# Patient Record
Sex: FEMALE | Race: WHITE | NOT HISPANIC OR LATINO | Employment: OTHER | URBAN - METROPOLITAN AREA
[De-identification: names, ages, dates, MRNs, and addresses within clinical notes are randomized per-mention and may not be internally consistent; named-entity substitution may affect disease eponyms.]

---

## 2018-03-28 ENCOUNTER — HOSPITAL ENCOUNTER (OUTPATIENT)
Dept: LAB | Facility: HOSPITAL | Age: 78
Discharge: HOME | End: 2018-03-28
Attending: INTERNAL MEDICINE | Admitting: INTERNAL MEDICINE

## 2018-06-27 ENCOUNTER — HOSPITAL ENCOUNTER (OUTPATIENT)
Dept: CARDIOLOGY | Facility: HOSPITAL | Age: 78
Discharge: HOME | End: 2018-06-27
Attending: INTERNAL MEDICINE | Admitting: INTERNAL MEDICINE

## 2018-08-06 ENCOUNTER — HOSPITAL ENCOUNTER (OUTPATIENT)
Dept: PREADMISSION TESTING | Facility: HOSPITAL | Age: 78
Discharge: HOME | End: 2018-08-06
Attending: INTERNAL MEDICINE | Admitting: INTERNAL MEDICINE

## 2018-08-06 LAB
ANION GAP SERPL CALC-SCNC: 13.9 MMOL/L (ref 10–20)
APTT BLD: 24.6 SEC (ref 24–31)
BASOPHILS # BLD AUTO: 0.1 10*3/UL (ref 0–0.2)
BASOPHILS NFR BLD AUTO: 1 % (ref 0–2)
BUN SERPL-MCNC: 20 MG/DL (ref 8–20)
BUN/CREAT SERPL: 13.3 (ref 5.4–26.2)
CALCIUM SERPL-MCNC: 8.9 MG/DL (ref 8.9–10.3)
CHLORIDE SERPL-SCNC: 98 MMOL/L (ref 101–111)
CONV CO2: 27 MMOL/L (ref 22–32)
CREAT UR-MCNC: 1.5 MG/DL (ref 0.4–1)
DIFFERENTIAL METHOD BLD: (no result)
EOSINOPHIL # BLD AUTO: 0.2 10*3/UL (ref 0–0.3)
EOSINOPHIL # BLD AUTO: 2 % (ref 0–3)
ERYTHROCYTE [DISTWIDTH] IN BLOOD BY AUTOMATED COUNT: 17.1 % (ref 11.5–14.5)
GLUCOSE SERPL-MCNC: 265 MG/DL (ref 65–99)
HCT VFR BLD AUTO: 40.5 % (ref 35–49)
HGB BLD-MCNC: 13.1 G/DL (ref 12–15)
INR PPP: 1
LYMPHOCYTES # BLD AUTO: 2.2 10*3/UL (ref 0.8–4.8)
LYMPHOCYTES NFR BLD AUTO: 24 % (ref 18–42)
MCH RBC QN AUTO: 27.3 PG (ref 26–32)
MCHC RBC AUTO-ENTMCNC: 32.5 G/DL (ref 32–36)
MCV RBC AUTO: 84.1 FL (ref 80–94)
MONOCYTES # BLD AUTO: 0.6 10*3/UL (ref 0.1–1.3)
MONOCYTES NFR BLD AUTO: 7 % (ref 2–11)
NEUTROPHILS # BLD AUTO: 6 10*3/UL (ref 2.3–8.6)
NEUTROPHILS NFR BLD AUTO: 66 % (ref 50–75)
NRBC BLD AUTO-RTO: 0 /100{WBCS}
NRBC/RBC NFR BLD MANUAL: 0 10*3/UL
PLATELET # BLD AUTO: 124 10*3/UL (ref 150–450)
PMV BLD AUTO: 12.3 FL (ref 7.4–10.4)
POTASSIUM SERPL-SCNC: 3.9 MMOL/L (ref 3.6–5.1)
PROTHROMBIN TIME: 11.1 SEC (ref 9.6–11.7)
RBC # BLD AUTO: 4.81 10*6/UL (ref 4–5.4)
SODIUM SERPL-SCNC: 135 MMOL/L (ref 136–144)
WBC # BLD AUTO: 9.2 10*3/UL (ref 4.5–11.5)

## 2018-08-15 ENCOUNTER — OFFICE VISIT (OUTPATIENT)
Dept: CARDIAC SURGERY | Facility: CLINIC | Age: 78
End: 2018-08-15

## 2018-08-15 VITALS
BODY MASS INDEX: 35.01 KG/M2 | SYSTOLIC BLOOD PRESSURE: 111 MMHG | RESPIRATION RATE: 20 BRPM | HEART RATE: 100 BPM | HEIGHT: 69 IN | DIASTOLIC BLOOD PRESSURE: 70 MMHG | OXYGEN SATURATION: 95 % | TEMPERATURE: 98 F | WEIGHT: 236.4 LBS

## 2018-08-15 DIAGNOSIS — I25.118 CORONARY ARTERY DISEASE INVOLVING NATIVE CORONARY ARTERY OF NATIVE HEART WITH OTHER FORM OF ANGINA PECTORIS (HCC): Primary | ICD-10-CM

## 2018-08-15 DIAGNOSIS — I34.0 NON-RHEUMATIC MITRAL REGURGITATION: ICD-10-CM

## 2018-08-15 PROCEDURE — 99205 OFFICE O/P NEW HI 60 MIN: CPT | Performed by: THORACIC SURGERY (CARDIOTHORACIC VASCULAR SURGERY)

## 2018-08-15 RX ORDER — IRBESARTAN 150 MG/1
150 TABLET ORAL NIGHTLY
COMMUNITY
End: 2019-01-01

## 2018-08-30 ENCOUNTER — OUTSIDE FACILITY SERVICE (OUTPATIENT)
Dept: CARDIAC SURGERY | Facility: CLINIC | Age: 78
End: 2018-08-30

## 2018-08-30 PROCEDURE — 33259 ABLATE ATRIA W/BYPASS ADD-ON: CPT | Performed by: PHYSICIAN ASSISTANT

## 2018-08-30 PROCEDURE — 33508 ENDOSCOPIC VEIN HARVEST: CPT | Performed by: THORACIC SURGERY (CARDIOTHORACIC VASCULAR SURGERY)

## 2018-08-30 PROCEDURE — 33519 CABG ARTERY-VEIN THREE: CPT | Performed by: PHYSICIAN ASSISTANT

## 2018-08-30 PROCEDURE — 33533 CABG ARTERIAL SINGLE: CPT | Performed by: THORACIC SURGERY (CARDIOTHORACIC VASCULAR SURGERY)

## 2018-08-30 PROCEDURE — 33508 ENDOSCOPIC VEIN HARVEST: CPT | Performed by: PHYSICIAN ASSISTANT

## 2018-08-30 PROCEDURE — 33519 CABG ARTERY-VEIN THREE: CPT | Performed by: THORACIC SURGERY (CARDIOTHORACIC VASCULAR SURGERY)

## 2018-08-30 PROCEDURE — 33533 CABG ARTERIAL SINGLE: CPT | Performed by: PHYSICIAN ASSISTANT

## 2018-08-30 PROCEDURE — 33259 ABLATE ATRIA W/BYPASS ADD-ON: CPT | Performed by: THORACIC SURGERY (CARDIOTHORACIC VASCULAR SURGERY)

## 2018-10-08 ENCOUNTER — OFFICE VISIT (OUTPATIENT)
Dept: CARDIAC SURGERY | Facility: CLINIC | Age: 78
End: 2018-10-08

## 2018-10-08 VITALS
RESPIRATION RATE: 20 BRPM | WEIGHT: 230 LBS | BODY MASS INDEX: 34.07 KG/M2 | OXYGEN SATURATION: 93 % | HEART RATE: 83 BPM | SYSTOLIC BLOOD PRESSURE: 150 MMHG | DIASTOLIC BLOOD PRESSURE: 66 MMHG | HEIGHT: 69 IN | TEMPERATURE: 97.6 F

## 2018-10-08 DIAGNOSIS — Z86.79 S/P MAZE OPERATION FOR ATRIAL FIBRILLATION: ICD-10-CM

## 2018-10-08 DIAGNOSIS — Z98.890 S/P MAZE OPERATION FOR ATRIAL FIBRILLATION: ICD-10-CM

## 2018-10-08 DIAGNOSIS — Z95.1 S/P CABG (CORONARY ARTERY BYPASS GRAFT): Primary | ICD-10-CM

## 2018-10-08 PROCEDURE — 99024 POSTOP FOLLOW-UP VISIT: CPT | Performed by: THORACIC SURGERY (CARDIOTHORACIC VASCULAR SURGERY)

## 2018-10-09 NOTE — PROGRESS NOTES
Gina Liz is a 77 y.o. female s/p CABG/Maze. She denies any signs or symptoms of myocardial ischemia, cerebrovascular event, or infection. She reports good exercise tolerance.    Sternum is stable, incision is clean, dry, and intact.   CTA B/L.   Lower extremity incisions are clean, dry and intact.  GCS 15, no neurologic deficit.    She appears to be doing well. Follow up with us will be on a PRN basis.

## 2019-01-01 ENCOUNTER — OFFICE VISIT (OUTPATIENT)
Dept: CARDIOLOGY | Facility: CLINIC | Age: 79
End: 2019-01-01

## 2019-01-01 ENCOUNTER — CLINICAL SUPPORT NO REQUIREMENTS (OUTPATIENT)
Dept: CARDIOLOGY | Facility: CLINIC | Age: 79
End: 2019-01-01

## 2019-01-01 VITALS
DIASTOLIC BLOOD PRESSURE: 81 MMHG | OXYGEN SATURATION: 100 % | BODY MASS INDEX: 32.64 KG/M2 | RESPIRATION RATE: 18 BRPM | SYSTOLIC BLOOD PRESSURE: 137 MMHG | HEART RATE: 91 BPM | WEIGHT: 221 LBS

## 2019-01-01 DIAGNOSIS — I49.5 SICK SINUS SYNDROME (HCC): ICD-10-CM

## 2019-01-01 DIAGNOSIS — I49.5 SICK SINUS SYNDROME (HCC): Primary | ICD-10-CM

## 2019-01-01 DIAGNOSIS — I10 ESSENTIAL HYPERTENSION: ICD-10-CM

## 2019-01-01 DIAGNOSIS — I48.19 PERSISTENT ATRIAL FIBRILLATION (HCC): Primary | ICD-10-CM

## 2019-01-01 DIAGNOSIS — Z95.0 PRESENCE OF CARDIAC PACEMAKER: ICD-10-CM

## 2019-01-01 PROCEDURE — 93000 ELECTROCARDIOGRAM COMPLETE: CPT | Performed by: INTERNAL MEDICINE

## 2019-01-01 PROCEDURE — 93280 PM DEVICE PROGR EVAL DUAL: CPT | Performed by: INTERNAL MEDICINE

## 2019-01-01 PROCEDURE — 99214 OFFICE O/P EST MOD 30 MIN: CPT | Performed by: INTERNAL MEDICINE

## 2019-01-01 RX ORDER — GLIMEPIRIDE 2 MG/1
2 TABLET ORAL 2 TIMES DAILY
Refills: 1 | COMMUNITY
Start: 2019-01-01

## 2019-01-01 RX ORDER — BUMETANIDE 1 MG/1
1 TABLET ORAL 2 TIMES DAILY
Refills: 0 | COMMUNITY
Start: 2019-01-01

## 2019-01-01 RX ORDER — CLOPIDOGREL BISULFATE 75 MG/1
75 TABLET ORAL DAILY
Refills: 4 | COMMUNITY
Start: 2019-01-01 | End: 2019-01-01

## 2019-01-01 RX ORDER — SPIRONOLACTONE 25 MG/1
1 TABLET ORAL DAILY
COMMUNITY
Start: 2014-03-05

## 2019-01-01 RX ORDER — AMIODARONE HYDROCHLORIDE 200 MG/1
200 TABLET ORAL DAILY
Refills: 3 | COMMUNITY
Start: 2019-01-01 | End: 2019-01-01

## 2019-01-01 RX ORDER — PANTOPRAZOLE SODIUM 40 MG/1
40 TABLET, DELAYED RELEASE ORAL DAILY PRN
Refills: 1 | COMMUNITY
Start: 2019-01-01

## 2019-01-01 RX ORDER — ROSUVASTATIN CALCIUM 20 MG/1
1 TABLET, COATED ORAL DAILY
COMMUNITY
Start: 2014-03-05

## 2019-01-01 RX ORDER — POTASSIUM CHLORIDE 750 MG/1
1 CAPSULE, EXTENDED RELEASE ORAL DAILY
COMMUNITY
Start: 2019-01-01

## 2019-01-01 RX ORDER — LEVOTHYROXINE SODIUM 0.07 MG/1
1 TABLET ORAL DAILY
COMMUNITY
Start: 2012-03-15

## 2019-12-02 PROBLEM — I10 BENIGN ESSENTIAL HYPERTENSION: Status: ACTIVE | Noted: 2019-01-01

## 2019-12-02 PROBLEM — Z95.0 PRESENCE OF CARDIAC PACEMAKER: Status: ACTIVE | Noted: 2018-01-22

## 2019-12-02 PROBLEM — I48.91 ATRIAL FIBRILLATION (HCC): Status: ACTIVE | Noted: 2019-01-01

## 2019-12-02 NOTE — PROGRESS NOTES
CC--Atrial fibrillation, hypertension and prior history of recurrent bleeding    Sub--79-year-old female patient with a pacemaker placed few years ago and  cames  for follow up-- patient has a Biotronik pacemaker with intermittent atrial fibrillation and was placed on anti coagulation. patient had significant issues with bleeding and had to see OBG/YN--She underwent a Watchman placement --She underwent ERIN which revealed excellent sealing of the appendage without any leak--Patient is off Coumadin-- developed progressive shortness of breath and underwent stress test followed by left heart catheterization revealing significant multivessel coronary artery disease needing bypass surgery and during that time had Maze procedure --patient went back to atrial fibrillation several months ago despite amiodarone intake and was recommended cardioversion which she has refused   She complains of fatigue and tiredness and also hair loss and loss of appetite   Her primary care physician does all her lab work     patient chronic medical problems include coronary artery disease, peripheral vascular disease, carotid artery disease, hypertension and diabetes and hyperlipidemia  She has prior history of heart failure  She complains of significant skin bruising with dual antiplatelet agents        assessment plan     sick sinus syndrome with pacemaker in situ --pacemaker is VDD type--underlying atrial fibrillation is ongoing  Persistent  atrial fibrillation with high a chads Vasc score-- prior history of recurrent bleeding-- patient not a candidate for long-term anticoagulation and post left atrial appendage  occlusion device   progressive coronary artery disease needing bypass surgery and Maze procedure with recurrent atrial arrhythmias   hypertension   coronary artery disease   peripheral vascular dx     normal pacemaker function  Rhythm control versus rate control discussed and patient does not want any more procedures  No role for  amiodarone and amiodarone should be stopped  Follow-up in 3 months  Stop Plavix for subcutaneous bleeding and continue aspirin  Medications reviewed        Vital Signs: Blood pressure 137/81 pulse rate is 91 patient afebrile respiration 12 times a minute  EKG shows underlying atrial fibrillation nonspecific ST-T wave changes and underlying artifacts with QRS of 104 QT of 403 along with right axis deviation and diffuse nonspecific ST-T wave changes and no significant EKG changes compared to prior EKG and EKG indication includes intermittent atrial arrhythmias    Current Allergies (reviewed today):  PENICILLIN (Moderate)      Past Medical History:     Reviewed history from 10/01/2018 and no changes required:        Hyperlipidemia        Hypertension        Peripheral Vascular Disease: S/P Endarterectomy         Carotid stenosis        Hypothyroidism        Atrial Fibrillation        Coronary Heart Disease: PCI with 3 stents with 2 cath/PCI's : CABG 4 vessel         SSS: S/P Pacemaker implantation         Diabetes, Type 2        Watchman (LAAC) device 03/29/2018    Past Surgical History:     Reviewed history from 10/01/2018 and no changes required:        Endarterectomy        Cardiac Cath, 2-8-00, LAD 50%, LCX 50%, RCA 50%        Carpal Tunnel -Right         Cataract Extraction: Bilateral         Pacemaker implantation         P T C A: first time one stent - second time 2 stents by Dr. Toledo         ERIN at [facility] : 2-27-18        Watchman (LAAC) device implant 03/29/2018        CABG: [facility] 8-30-18 4 vessel / LIMA to LAD, SV to PDA, SV seq to OM1 and then OM2 . LLE EVH. [facility]-Maze         Social History:     Reviewed history from 08/21/2018 and no changes required:        Patient is a former smoker.        Passive Smoke: N        Alcohol Use: N        Drug Use: N        HIV/High Risk: N        Regular Exercise: N        Hx Domestic Abuse: N        Zoroastrian Affecting Care: N              Review of Systems   General:  positive  for fatigue and shortness of breath  Eyes: No redness  Cardiovascular: No chest pain, no palpitations  Gastrointestinal: No nausea or vomiting, bleeding  Genitourinary: no hematuria or dysuria  Musculoskeletal: No arthralgia or myalgia  Skin: No rash  Neurologic: No numbness, tingling, syncope  Hematologic/Lymphatic: No abnormal bleeding      Physical Exam    General:      well developed, well nourished, in no acute distress.    Head:      normocephalic and atraumatic.    Eyes:      PERRL/EOM intact, conjunctiva and sclera clear with out nystagmus.    Neck:       no JVD.  Soft left carotid bruit audible   trachea central and normal respiratory effort  Lungs:      clear bilaterally to auscultation.    Heart:      non-displaced PMI, chest non-tender; irregular rate and rhythm, S1, S2 without murmurs, rubs, or gallops  Skin:      intact without lesions or rashes.    Psych:      alert and cooperative; normal mood and affect; normal attention span and concentration.        Abdomen soft and nontender without hepatomegaly  Neurologically alert and oriented x3 without any focal deficits  Mild peripheral edema and subcutaneous bruising noted  Back exam shows mild kyphosis

## 2020-01-01 ENCOUNTER — OUTSIDE FACILITY SERVICE (OUTPATIENT)
Dept: CARDIOLOGY | Facility: CLINIC | Age: 80
End: 2020-01-01

## 2020-01-01 PROCEDURE — 93010 ELECTROCARDIOGRAM REPORT: CPT | Performed by: INTERNAL MEDICINE

## 2020-01-01 PROCEDURE — 99232 SBSQ HOSP IP/OBS MODERATE 35: CPT | Performed by: INTERNAL MEDICINE

## 2020-01-09 ENCOUNTER — INPATIENT HOSPITAL (OUTPATIENT)
Dept: URBAN - METROPOLITAN AREA HOSPITAL 76 | Facility: HOSPITAL | Age: 80
End: 2020-01-09

## 2020-01-09 DIAGNOSIS — R18.8 OTHER ASCITES: ICD-10-CM

## 2020-01-09 DIAGNOSIS — R94.5 ABNORMAL RESULTS OF LIVER FUNCTION STUDIES: ICD-10-CM

## 2020-01-09 DIAGNOSIS — K74.60 UNSPECIFIED CIRRHOSIS OF LIVER: ICD-10-CM

## 2020-01-09 DIAGNOSIS — R93.3 ABNORMAL FINDINGS ON DIAGNOSTIC IMAGING OF OTHER PARTS OF DI: ICD-10-CM

## 2020-01-09 PROCEDURE — 99222 1ST HOSP IP/OBS MODERATE 55: CPT | Performed by: NURSE PRACTITIONER

## 2020-04-22 NOTE — PROGRESS NOTES
BCS CONSULT    Reason for Consultation: Possible surgical revascularization.    History of Present Illness:     This is a pleasant 77 year old woman with a history of  CAD with stenting, chronic atrial fibrillation, and sick sinus syndrome. She does have a single chamber PPM and also received a Watchman device earlier this year. She has also had problems with bleeding (GYN) on Coumadin. Over the last 2-4 months she has suffered increasing dyspnea, now even on moderate exertion. She has also noticed periodic lower extremity edema that resolves with leg elevation. She denies any orthopnea or PND. She denies any apneic symptoms. She denies any classic or surrogate anginal symptoms. She denies any syncope or disorientation.    LHC on 8/7/18 showed an 80% proximal LAD lesion, a mid 90% LAD lesion, and a small distal artery. The CX system has an 80% lesion and a patent distal stent in the CX main body. Two moderated sized OM branches could potentially receive bypasses. The dominant RCA system has a proximal patent stent, but has a 70% distal lesion; the PDA appears to be a viable surgical target.    A ERIN performed on 5/15/18 showed a LVEF of 50%, an intact Watchman device, and moderate mitral valve regurgitation. Mild tricuspid valve regurgitation is also noted.    She has been referred for possible surgical revascularization.    Review of Systems   Constitutional: Positive for activity change and fatigue. Negative for appetite change, chills, diaphoresis, fever and unexpected weight change.   HENT: Negative for congestion, dental problem, hearing loss, mouth sores, nosebleeds, postnasal drip, rhinorrhea, sneezing, trouble swallowing and voice change.    Eyes: Negative for visual disturbance.   Respiratory: Positive for shortness of breath and wheezing. Negative for apnea, cough, choking, chest tightness and stridor.    Cardiovascular: Positive for leg swelling. Negative for chest pain and palpitations.    Gastrointestinal: Negative for abdominal pain, constipation, diarrhea, nausea and vomiting.   Endocrine: Negative for cold intolerance, heat intolerance and polyuria.   Genitourinary: Negative for difficulty urinating, dysuria and hematuria.   Musculoskeletal: Positive for back pain. Negative for arthralgias, gait problem, joint swelling, myalgias, neck pain and neck stiffness.   Skin: Negative for color change, pallor, rash and wound.   Neurological: Negative for dizziness, tremors, seizures, syncope, facial asymmetry, speech difficulty, weakness, light-headedness, numbness and headaches.   Hematological: Negative for adenopathy. Does not bruise/bleed easily.   Psychiatric/Behavioral: Positive for sleep disturbance. Negative for agitation, behavioral problems, confusion, decreased concentration, dysphoric mood, hallucinations, self-injury and suicidal ideas. The patient is not nervous/anxious and is not hyperactive.         Past Medical History:   Diagnosis Date   • Abnormal cardiovascular stress test 07/2018    Progressive Ischemia   • Atrial fibrillation (CMS/HCC)     On Anticoag   • CAD (coronary artery disease)     Multi Vessel   • Carotid arterial disease (CMS/HCC)    • Diabetes (CMS/HCC)     Type 2   • History of echocardiogram 3/30/18-[facility]    See Report w/Mod to Severe Pulm Hypertension   • Hyperlipidemia     Meds   • Hypertension     Controlled   • Hypothyroidism     Synthroid   • Pacemaker 2015 ?    Biotronik   • Peripheral vascular disorder (CMS/HCC)    • PMB (postmenopausal bleeding)     Saw OBGYN   • Pulmonary hypertension 03/30/2018    Mod to Severe According to Echo   • SOB (shortness of breath) 7/17/18-[place]    Mild to Moderate--Saw Dr. Harris     Past Surgical History:   Procedure Laterality Date   • CARDIAC ASSIST DEVICE INSERTION  3/29/18-Inserted    LAAC Placement (Watchman)   • CARDIAC CATHETERIZATION  08/7/18-[facility]   • CATARACT EXTRACTION Bilateral    •  "CORONARY ANGIOPLASTY WITH STENT PLACEMENT      x3 Stents Placed  w Hx 2 Cath's   • PACEMAKER IMPLANTATION     • ERIN  02/27/2018    Normal      No family history on file.  Social History   Substance Use Topics   • Smoking status: Former Smoker     Packs/day: 1.00     Types: Cigarettes   • Smokeless tobacco: Never Used      Comment: Quit in 1980   • Alcohol use No      Comment: 1 Drink Caffeine/Day       (Not in a hospital admission)    Current Outpatient Prescriptions:   •  aspirin 81 MG tablet, Take 81 mg by mouth Daily., Disp: , Rfl:   •  Cholecalciferol (VITAMIN D3) 5000 units capsule capsule, Take 1,000 Units by mouth Daily., Disp: , Rfl:   •  clopidogrel (PLAVIX) 75 MG tablet, Take 75 mg by mouth Daily., Disp: , Rfl:   •  digoxin (LANOXIN) 250 MCG tablet, Take 250 mcg by mouth Daily., Disp: , Rfl:   •  DilTIAZem HCl Coated Beads (DILT-CD PO), Take 240 mg by mouth., Disp: , Rfl:   •  furosemide (LASIX) 20 MG tablet, Take 20 mg by mouth Daily., Disp: , Rfl:   •  hydrochlorothiazide (HYDRODIURIL) 25 MG tablet, Take 30 mg by mouth Daily., Disp: , Rfl:   •  insulin NPH (HUMULIN N) 100 UNIT/ML injection, Inject  under the skin into the appropriate area as directed 2 (Two) Times a Day Before Meals., Disp: , Rfl:   •  irbesartan (AVAPRO) 150 MG tablet, Take 150 mg by mouth Every Night., Disp: , Rfl:   •  lansoprazole (PREVACID) 30 MG capsule, Take 30 mg by mouth Daily., Disp: , Rfl:   •  levothyroxine (SYNTHROID, LEVOTHROID) 88 MCG tablet, Take 88 mcg by mouth Daily., Disp: , Rfl:   •  Loratadine (CLARITIN) 10 MG capsule, Take  by mouth Daily., Disp: , Rfl:   •  pravastatin (PRAVACHOL) 20 MG tablet, Take 20 mg by mouth Daily., Disp: , Rfl:     Allergies:  Penicillins    Objective      Vital Signs       Flowsheet Rows      First Filed Value   Admission Height  175.3 cm (69\") Documented at 08/15/2018 0855   Admission Weight  107 kg (236 lb 6.4 oz) Documented at 08/15/2018 0855        175.3 cm (69\")    Physical Exam "   Constitutional: She is oriented to person, place, and time. She appears well-developed and well-nourished.   HENT:   Head: Normocephalic and atraumatic.   Mouth/Throat: No oropharyngeal exudate.   Eyes: Pupils are equal, round, and reactive to light. EOM are normal. No scleral icterus.   Neck: Normal range of motion. Neck supple. No JVD present. No tracheal deviation present. No thyromegaly present.   Cardiovascular: Normal rate, regular rhythm and intact distal pulses.  Exam reveals no gallop and no friction rub.    Murmur heard.  Pulmonary/Chest: Effort normal. No stridor. No respiratory distress. She has no wheezes. She has rales. She exhibits no tenderness.   No sternal abnormality.   Abdominal: Soft. Bowel sounds are normal. She exhibits no distension and no mass. There is no tenderness. There is no guarding.   Musculoskeletal: Normal range of motion. She exhibits edema. She exhibits no tenderness or deformity.   Lymphadenopathy:     She has no cervical adenopathy.   Neurological: She is alert and oriented to person, place, and time. No cranial nerve deficit or sensory deficit.   Skin: Skin is warm and dry. Capillary refill takes 2 to 3 seconds. No rash noted. No erythema. No pallor.   Psychiatric: She has a normal mood and affect. Her behavior is normal. Judgment and thought content normal.       Results Review:       Assessment/Plan:     This is a pleasant 77 year old woman with progressive three vessel CAD and moderate mitral valve regurgitation. She also has a single chamber PPM and a Watchman device. I believe she would benefit from surgical revascularization and mitral valve repair. The patient is agreeable.       Asuncion Arnett MD  08/19/18  10:07 AM     22-Apr-2020 18:19